# Patient Record
Sex: FEMALE
[De-identification: names, ages, dates, MRNs, and addresses within clinical notes are randomized per-mention and may not be internally consistent; named-entity substitution may affect disease eponyms.]

---

## 2020-12-31 PROBLEM — Z00.00 ENCOUNTER FOR PREVENTIVE HEALTH EXAMINATION: Status: ACTIVE | Noted: 2020-12-31

## 2021-01-21 ENCOUNTER — APPOINTMENT (OUTPATIENT)
Dept: NEPHROLOGY | Facility: CLINIC | Age: 26
End: 2021-01-21
Payer: MEDICAID

## 2021-01-21 ENCOUNTER — LABORATORY RESULT (OUTPATIENT)
Age: 26
End: 2021-01-21

## 2021-01-21 VITALS — TEMPERATURE: 98.4 F

## 2021-01-21 DIAGNOSIS — N12 TUBULO-INTERSTITIAL NEPHRITIS, NOT SPECIFIED AS ACUTE OR CHRONIC: ICD-10-CM

## 2021-01-21 DIAGNOSIS — N39.0 URINARY TRACT INFECTION, SITE NOT SPECIFIED: ICD-10-CM

## 2021-01-21 DIAGNOSIS — Z78.9 OTHER SPECIFIED HEALTH STATUS: ICD-10-CM

## 2021-01-21 PROCEDURE — 99204 OFFICE O/P NEW MOD 45 MIN: CPT

## 2021-01-21 PROCEDURE — 99072 ADDL SUPL MATRL&STAF TM PHE: CPT

## 2021-01-21 RX ORDER — FOSFOMYCIN TROMETHAMINE 3 G/1
3 POWDER ORAL
Qty: 1 | Refills: 3 | Status: ACTIVE | COMMUNITY
Start: 2021-01-21 | End: 1900-01-01

## 2021-01-21 NOTE — ASSESSMENT
[FreeTextEntry1] : # Pyelonephritis.\par * Suspect either incompletely treated pyelo or recurrent pyelonephritis this year.\par * She will obtain recent culture results and lab tests. \par * Recheck U/A and urine culture. \par * Fosfomycin packet given to be taken with symptoms of UTI. She will call immediately with any fever, back pain, or symptoms of pyelo immediately. \par *  A point of care renal ultrasound using the Butterfly iQ was performed and the images were personally reviewed. (The patient understands that this was a brief study to evaluate for hydronephrosis and not a complete renal ultrasound.) The ultrasound showed no significant hydronephrosis. A complete renal ultrasound was recommended and information was provided to the patient about scheduling. They were instructed that this imaging study is important for their health and that it is their responsibility to make and keep this appointment. All their questions were answered.\par * Counseling on UTIs from [facility] given. \par * Follow up within 4 months.

## 2021-01-21 NOTE — PHYSICAL EXAM
[General Appearance - Alert] : alert [General Appearance - In No Acute Distress] : in no acute distress [Auscultation Breath Sounds / Voice Sounds] : lungs were clear to auscultation bilaterally [Heart Rate And Rhythm] : heart rate was normal and rhythm regular [Heart Sounds] : normal S1 and S2 [Heart Sounds Gallop] : no gallops [Murmurs] : no murmurs [Heart Sounds Pericardial Friction Rub] : no pericardial rub [Edema] : there was no peripheral edema [Bowel Sounds] : normal bowel sounds [Abdomen Soft] : soft [Abdomen Tenderness] : non-tender [] : no hepato-splenomegaly [Abdomen Mass (___ Cm)] : no abdominal mass palpated [No CVA Tenderness] : no ~M costovertebral angle tenderness

## 2021-01-21 NOTE — HISTORY OF PRESENT ILLNESS
[FreeTextEntry1] : Self-referred for UTI. \par \par * First UTi at age 22. Developed urinary urgency and cloudy urine with dysuria. Subsequent developed hematuria. This resolved on its own after about a month. She didn't take abx or get evaluated. "I didn't have time." One year ago developed foul smelling urine and cloudy urine. No dysuria or fever or back pain. This resolved on its own without evaluation or abx after 1 month. 3 months ago developed fever of 102, epigastric pain, right flank pain, foul smelling cloudy urine. No dysuria or hematuria. She was evaluated at [facility] and dx with pyelo. Treated with keflex. Covid negative. Creatinine 0.85. U/a 1+ blood leukoposites psotive, trace protein, U/A 6.5. WBC 9.7. Treated initially with 10 days of keflex with resolved of symptoms. Felt well for 3 weeks, but then developed recurrence of right sided back pain and fever and other symptoms. She returned to ER on 12/21. Reportedly labs were no different. (The patient has been advised to call their office and arrange copies of notes/labs to be sent.) No imaging studies. Treated with keflex for 1 week with resolution of symptoms. No symptoms currently. \par \par She lives with a partner and her UTis were unrelated to sexual activity. \par \par STD tests were reportedly negative by gyn 3 weeks. \par \par

## 2021-01-27 LAB
APPEARANCE: CLEAR
BACTERIA UR CULT: NORMAL
BILIRUBIN URINE: NEGATIVE
BLOOD URINE: ABNORMAL
COLOR: NORMAL
CREAT SPEC-SCNC: 80 MG/DL
GLUCOSE QUALITATIVE U: NEGATIVE
KETONES URINE: NEGATIVE
LEUKOCYTE ESTERASE URINE: NEGATIVE
MICROALBUMIN 24H UR DL<=1MG/L-MCNC: <1.2 MG/DL
MICROALBUMIN/CREAT 24H UR-RTO: NORMAL MG/G
NITRITE URINE: NEGATIVE
PH URINE: 7.5
PROTEIN URINE: NEGATIVE
SPECIFIC GRAVITY URINE: 1.02
UROBILINOGEN URINE: NORMAL

## 2023-04-22 ENCOUNTER — HOSPITAL ENCOUNTER (OUTPATIENT)
Dept: HOSPITAL 74 - JER | Age: 28
Setting detail: OBSERVATION
LOS: 1 days | Discharge: HOME | End: 2023-04-23
Attending: STUDENT IN AN ORGANIZED HEALTH CARE EDUCATION/TRAINING PROGRAM | Admitting: INTERNAL MEDICINE
Payer: COMMERCIAL

## 2023-04-22 VITALS — BODY MASS INDEX: 27.8 KG/M2

## 2023-04-22 DIAGNOSIS — R74.8: ICD-10-CM

## 2023-04-22 DIAGNOSIS — Z87.59: ICD-10-CM

## 2023-04-22 DIAGNOSIS — G89.4: ICD-10-CM

## 2023-04-22 LAB
ALBUMIN SERPL-MCNC: 4 G/DL (ref 3.4–5)
ALP SERPL-CCNC: 119 U/L (ref 45–117)
ALT SERPL-CCNC: 20 U/L (ref 13–61)
ANION GAP SERPL CALC-SCNC: 8 MMOL/L (ref 8–16)
APPEARANCE UR: CLEAR
APTT BLD: 28.6 SECONDS (ref 25.2–36.5)
AST SERPL-CCNC: 12 U/L (ref 15–37)
BACTERIA # UR AUTO: 69 /UL (ref 0–1359)
BASE EXCESS BLDV CALC-SCNC: -3.2 MMOL/L (ref -2–2)
BASOPHILS # BLD: 0.3 % (ref 0–2)
BILIRUB SERPL-MCNC: 0.6 MG/DL (ref 0.2–1)
BILIRUB UR STRIP.AUTO-MCNC: NEGATIVE MG/DL
BUN SERPL-MCNC: 5.6 MG/DL (ref 7–18)
CALCIUM SERPL-MCNC: 8.8 MG/DL (ref 8.5–10.1)
CASTS URNS QL MICRO: 0 /UL (ref 0–3.1)
CHLORIDE SERPL-SCNC: 108 MMOL/L (ref 98–107)
CO2 SERPL-SCNC: 23 MMOL/L (ref 21–32)
COLOR UR: (no result)
CREAT SERPL-MCNC: 0.7 MG/DL (ref 0.55–1.3)
DEPRECATED RDW RBC AUTO: 15.5 % (ref 11.6–15.6)
EOSINOPHIL # BLD: 0.1 % (ref 0–4.5)
EPITH CASTS URNS QL MICRO: 24 /UL (ref 0–25.1)
GLUCOSE SERPL-MCNC: 94 MG/DL (ref 74–106)
HCT VFR BLD CALC: 26 % (ref 32.4–45.2)
HCT VFR BLDV CALC: 36 % (ref 32.4–45.2)
HGB BLD-MCNC: 8.8 GM/DL (ref 10.7–15.3)
INR BLD: 1.27 (ref 0.83–1.09)
KETONES UR QL STRIP: (no result)
LEUKOCYTE ESTERASE UR QL STRIP.AUTO: (no result)
LYMPHOCYTES # BLD: 18.5 % (ref 8–40)
MCH RBC QN AUTO: 27.4 PG (ref 25.7–33.7)
MCHC RBC AUTO-ENTMCNC: 33.8 G/DL (ref 32–36)
MCV RBC: 81.2 FL (ref 80–96)
MONOCYTES # BLD AUTO: 8.2 % (ref 3.8–10.2)
NEUTROPHILS # BLD: 72.9 % (ref 42.8–82.8)
NITRITE UR QL STRIP: NEGATIVE
PCO2 BLDV: 41.9 MMHG (ref 38–52)
PH BLDV: 7.34 [PH] (ref 7.31–7.41)
PH UR: 6 [PH] (ref 5–8)
PLATELET # BLD AUTO: 174 10^3/UL (ref 134–434)
PMV BLD: 9.6 FL (ref 7.5–11.1)
PROT SERPL-MCNC: 8 G/DL (ref 6.4–8.2)
PROT UR QL STRIP: NEGATIVE
PROT UR QL STRIP: NEGATIVE
PT PNL PPP: 14.7 SEC (ref 9.7–13)
RBC # BLD AUTO: 116 /UL (ref 0–23.9)
RBC # BLD AUTO: 3.21 M/MM3 (ref 3.6–5.2)
SAO2 % BLDV: 31.7 % (ref 70–80)
SODIUM SERPL-SCNC: 139 MMOL/L (ref 136–145)
SP GR UR: 1 (ref 1.01–1.03)
UROBILINOGEN UR STRIP-MCNC: 0.2 MG/DL (ref 0.2–1)
WBC # BLD AUTO: 3.6 K/MM3 (ref 4–10)
WBC # UR AUTO: 33 /UL (ref 0–25.8)
YEAST BUDDING URNS QL: (no result)

## 2023-04-22 PROCEDURE — 3E033NZ INTRODUCTION OF ANALGESICS, HYPNOTICS, SEDATIVES INTO PERIPHERAL VEIN, PERCUTANEOUS APPROACH: ICD-10-PCS | Performed by: STUDENT IN AN ORGANIZED HEALTH CARE EDUCATION/TRAINING PROGRAM

## 2023-04-22 PROCEDURE — 3E033GC INTRODUCTION OF OTHER THERAPEUTIC SUBSTANCE INTO PERIPHERAL VEIN, PERCUTANEOUS APPROACH: ICD-10-PCS | Performed by: STUDENT IN AN ORGANIZED HEALTH CARE EDUCATION/TRAINING PROGRAM

## 2023-04-22 PROCEDURE — 3E0337Z INTRODUCTION OF ELECTROLYTIC AND WATER BALANCE SUBSTANCE INTO PERIPHERAL VEIN, PERCUTANEOUS APPROACH: ICD-10-PCS | Performed by: STUDENT IN AN ORGANIZED HEALTH CARE EDUCATION/TRAINING PROGRAM

## 2023-04-22 PROCEDURE — 3E03329 INTRODUCTION OF OTHER ANTI-INFECTIVE INTO PERIPHERAL VEIN, PERCUTANEOUS APPROACH: ICD-10-PCS | Performed by: STUDENT IN AN ORGANIZED HEALTH CARE EDUCATION/TRAINING PROGRAM

## 2023-04-22 PROCEDURE — G0378 HOSPITAL OBSERVATION PER HR: HCPCS

## 2023-04-23 VITALS — RESPIRATION RATE: 18 BRPM | HEART RATE: 84 BPM

## 2023-04-23 VITALS — TEMPERATURE: 98.5 F | SYSTOLIC BLOOD PRESSURE: 114 MMHG | DIASTOLIC BLOOD PRESSURE: 62 MMHG

## 2023-04-23 LAB
DEPRECATED RDW RBC AUTO: 15.3 % (ref 11.6–15.6)
HCT VFR BLD CALC: 24.8 % (ref 32.4–45.2)
HGB BLD-MCNC: 8.6 GM/DL (ref 10.7–15.3)
IRON SERPL-MCNC: 13 UG/DL (ref 50–175)
MAGNESIUM SERPL-MCNC: 2 MG/DL (ref 1.8–2.4)
MCH RBC QN AUTO: 27.9 PG (ref 25.7–33.7)
MCHC RBC AUTO-ENTMCNC: 34.7 G/DL (ref 32–36)
MCV RBC: 80.3 FL (ref 80–96)
PLATELET # BLD AUTO: 166 10^3/UL (ref 134–434)
PMV BLD: 10.6 FL (ref 7.5–11.1)
RBC # BLD AUTO: 3.09 M/MM3 (ref 3.6–5.2)
TIBC SERPL-MCNC: 367 UG/DL (ref 250–450)
WBC # BLD AUTO: 2.9 K/MM3 (ref 4–10)

## 2024-07-08 ENCOUNTER — HOSPITAL ENCOUNTER (EMERGENCY)
Dept: HOSPITAL 74 - JER | Age: 29
LOS: 1 days | Discharge: HOME | End: 2024-07-09
Payer: COMMERCIAL

## 2024-07-08 VITALS — BODY MASS INDEX: 26.6 KG/M2

## 2024-07-08 DIAGNOSIS — Z53.21: Primary | ICD-10-CM

## 2024-07-09 VITALS
SYSTOLIC BLOOD PRESSURE: 92 MMHG | RESPIRATION RATE: 18 BRPM | TEMPERATURE: 98.4 F | DIASTOLIC BLOOD PRESSURE: 62 MMHG | HEART RATE: 83 BPM